# Patient Record
Sex: MALE | Race: WHITE | NOT HISPANIC OR LATINO | Employment: FULL TIME | URBAN - METROPOLITAN AREA
[De-identification: names, ages, dates, MRNs, and addresses within clinical notes are randomized per-mention and may not be internally consistent; named-entity substitution may affect disease eponyms.]

---

## 2019-04-16 ENCOUNTER — HOSPITAL ENCOUNTER (OUTPATIENT)
Facility: MEDICAL CENTER | Age: 24
End: 2019-04-16
Attending: NURSE PRACTITIONER

## 2019-04-16 ENCOUNTER — OFFICE VISIT (OUTPATIENT)
Dept: URGENT CARE | Facility: MEDICAL CENTER | Age: 24
End: 2019-04-16

## 2019-04-16 VITALS
HEART RATE: 104 BPM | WEIGHT: 188 LBS | OXYGEN SATURATION: 95 % | HEIGHT: 73 IN | TEMPERATURE: 98.1 F | RESPIRATION RATE: 16 BRPM | BODY MASS INDEX: 24.92 KG/M2 | DIASTOLIC BLOOD PRESSURE: 72 MMHG | SYSTOLIC BLOOD PRESSURE: 110 MMHG

## 2019-04-16 DIAGNOSIS — J02.9 SORE THROAT: ICD-10-CM

## 2019-04-16 LAB
INT CON NEG: NEGATIVE
INT CON POS: POSITIVE
S PYO AG THROAT QL: NEGATIVE

## 2019-04-16 PROCEDURE — 99203 OFFICE O/P NEW LOW 30 MIN: CPT | Performed by: NURSE PRACTITIONER

## 2019-04-16 PROCEDURE — 87880 STREP A ASSAY W/OPTIC: CPT | Performed by: NURSE PRACTITIONER

## 2019-04-16 PROCEDURE — 87070 CULTURE OTHR SPECIMN AEROBIC: CPT

## 2019-04-16 RX ORDER — DIPHENHYDRAMINE HYDROCHLORIDE AND LIDOCAINE HYDROCHLORIDE AND ALUMINUM HYDROXIDE AND MAGNESIUM HYDRO
15 KIT EVERY 6 HOURS PRN
Qty: 100 ML | Refills: 0 | Status: SHIPPED | OUTPATIENT
Start: 2019-04-16

## 2019-04-16 ASSESSMENT — ENCOUNTER SYMPTOMS
SHORTNESS OF BREATH: 0
ABDOMINAL PAIN: 0
MUSCULOSKELETAL NEGATIVE: 1
SORE THROAT: 1
CONSTIPATION: 0
HEADACHES: 0
PALPITATIONS: 0
SPUTUM PRODUCTION: 0
VOMITING: 1
STRIDOR: 0
COUGH: 0
BLOOD IN STOOL: 0
NAUSEA: 0
HEARTBURN: 0
DIZZINESS: 0
SWOLLEN GLANDS: 0
FEVER: 1
DIARRHEA: 0
WHEEZING: 0
BLURRED VISION: 0
DOUBLE VISION: 0
CHILLS: 0

## 2019-04-16 NOTE — PROGRESS NOTES
Subjective:   Jonathan Robertson is a 23 y.o. male who presents for Sore Throat (fever, vomiting, x 4 days )        Pharyngitis    This is a new problem. The current episode started in the past 7 days (Started 4 days ago). The problem has been waxing and waning. Neither side of throat is experiencing more pain than the other. Maximum temperature: With fever, unsure of temp. The pain is moderate. Associated symptoms include congestion and vomiting. Pertinent negatives include no abdominal pain, coughing, diarrhea, ear discharge, ear pain, headaches, plugged ear sensation, shortness of breath, stridor or swollen glands. He has tried NSAIDs for the symptoms. The treatment provided mild relief.        Review of Systems   Constitutional: Positive for fever. Negative for chills.   HENT: Positive for congestion and sore throat. Negative for ear discharge and ear pain.    Eyes: Negative for blurred vision and double vision.   Respiratory: Negative for cough, sputum production, shortness of breath, wheezing and stridor.    Cardiovascular: Negative for chest pain and palpitations.   Gastrointestinal: Positive for vomiting. Negative for abdominal pain, blood in stool, constipation, diarrhea, heartburn, melena and nausea.   Musculoskeletal: Negative.    Skin: Negative.  Negative for itching and rash.   Neurological: Negative for dizziness and headaches.   All other systems reviewed and are negative.    PMH:  has no past medical history on file.  MEDS:   Current Outpatient Prescriptions:   •  DPH-Lido-AlHydr-MgHydr-Simeth (MAGIC MOUTHWASH BLM) Suspension, Take 15 mL by mouth every 6 hours as needed., Disp: 100 mL, Rfl: 0  ALLERGIES: No Known Allergies  SURGHX: History reviewed. No pertinent surgical history.  SOCHX:  reports that he has never smoked. He has never used smokeless tobacco. He reports that he uses drugs, including Marijuana. He reports that he does not drink alcohol.  FH: Family history was reviewed, no pertinent  "findings to report     Objective:   /72   Pulse (!) 104   Temp 36.7 °C (98.1 °F)   Resp 16   Ht 1.854 m (6' 1\")   Wt 85.3 kg (188 lb)   SpO2 95%   BMI 24.80 kg/m²   Physical Exam   Constitutional: He is oriented to person, place, and time. He appears well-developed and well-nourished. No distress.   HENT:   Head: Normocephalic.   Right Ear: Hearing, tympanic membrane and ear canal normal. Tympanic membrane is not erythematous. No middle ear effusion.   Left Ear: Hearing, tympanic membrane and ear canal normal. Tympanic membrane is not erythematous.  No middle ear effusion.   Nose: No rhinorrhea. Right sinus exhibits no maxillary sinus tenderness and no frontal sinus tenderness. Left sinus exhibits no maxillary sinus tenderness and no frontal sinus tenderness.   Mouth/Throat: Mucous membranes are normal. Posterior oropharyngeal erythema present. Tonsils are 1+ on the right. Tonsils are 1+ on the left. No tonsillar exudate.   Eyes: Pupils are equal, round, and reactive to light. Conjunctivae, EOM and lids are normal.   Neck: Normal range of motion. No thyromegaly present.   Cardiovascular: Normal rate, regular rhythm and normal heart sounds.    Pulmonary/Chest: Effort normal and breath sounds normal. No respiratory distress. He has no wheezes.   Lymphadenopathy:        Head (right side): No submandibular and no tonsillar adenopathy present.        Head (left side): Tonsillar adenopathy present. No submandibular adenopathy present.   Neurological: He is alert and oriented to person, place, and time.   Skin: Skin is warm and dry. No rash noted. He is not diaphoretic.   Psychiatric: He has a normal mood and affect. His speech is normal and behavior is normal. Judgment and thought content normal.   Vitals reviewed.        Assessment/Plan:   Assessment    1. Sore throat  - POCT Rapid Strep A  - DPH-Lido-AlHydr-MgHydr-Simeth (MAGIC MOUTHWASH BLM) Suspension; Take 15 mL by mouth every 6 hours as needed.  " Dispense: 100 mL; Refill: 0  - CULTURE THROAT; Future    Rapid strep negative. Discussed most likely viral in nature. Sent for culture and will call with results.  May take over-the-counter Ibuprofen 600-800 mg every 8 hours as needed for pain    Differential diagnosis, natural history, supportive care, and indications for immediate follow-up discussed.

## 2019-04-18 LAB
BACTERIA SPEC RESP CULT: NORMAL
SIGNIFICANT IND 70042: NORMAL
SITE SITE: NORMAL
SOURCE SOURCE: NORMAL

## 2019-04-19 ENCOUNTER — TELEPHONE (OUTPATIENT)
Dept: URGENT CARE | Facility: MEDICAL CENTER | Age: 24
End: 2019-04-19

## 2019-04-19 NOTE — TELEPHONE ENCOUNTER
I gave the patient a call but it went to voicemail. Left the message on voicemail and informed if he had any questions to give us a call.     ----- Message from BENJIE Powers sent at 4/19/2019  2:35 PM PDT -----  Could you please call and let the patient know there throat culture came back negative? Therefore, there are no antibiotics indicated at this time because it is viral and they should already be seeing improvement.    Thank you,    CHACORTA Powers    ----- Message -----  From: Int, Lab  Sent: 4/17/2019  12:52 PM  To: BENJIE Powers